# Patient Record
Sex: MALE | Race: BLACK OR AFRICAN AMERICAN | URBAN - METROPOLITAN AREA
[De-identification: names, ages, dates, MRNs, and addresses within clinical notes are randomized per-mention and may not be internally consistent; named-entity substitution may affect disease eponyms.]

---

## 2021-05-27 ENCOUNTER — WEB ENCOUNTER (OUTPATIENT)
Dept: URBAN - METROPOLITAN AREA CLINIC 128 | Facility: CLINIC | Age: 61
End: 2021-05-27

## 2021-05-27 ENCOUNTER — TELEPHONE ENCOUNTER (OUTPATIENT)
Dept: URBAN - METROPOLITAN AREA CLINIC 5 | Facility: CLINIC | Age: 61
End: 2021-05-27

## 2021-05-27 ENCOUNTER — OFFICE VISIT (OUTPATIENT)
Dept: URBAN - METROPOLITAN AREA CLINIC 128 | Facility: CLINIC | Age: 61
End: 2021-05-27
Payer: OTHER GOVERNMENT

## 2021-05-27 DIAGNOSIS — Z12.11 SCREEN FOR COLON CANCER: ICD-10-CM

## 2021-05-27 DIAGNOSIS — K75.4 AUTOIMMUNE HEPATITIS: ICD-10-CM

## 2021-05-27 PROCEDURE — 99203 OFFICE O/P NEW LOW 30 MIN: CPT | Performed by: INTERNAL MEDICINE

## 2021-05-27 RX ORDER — URSODIOL 500 MG/1
1 TABLET TABLET ORAL ONCE A DAY
Status: ACTIVE | COMMUNITY

## 2021-05-27 RX ORDER — TAMSULOSIN HYDROCHLORIDE 0.4 MG/1
1 CAPSULE CAPSULE ORAL ONCE A DAY
Status: ACTIVE | COMMUNITY

## 2021-05-27 RX ORDER — AZATHIOPRINE 75 MG/1
AS DIRECTED TABLET ORAL
Status: ACTIVE | COMMUNITY

## 2021-05-27 RX ORDER — SODIUM, POTASSIUM,MAG SULFATES 17.5-3.13G
SOLUTION, RECONSTITUTED, ORAL ORAL ONCE
Qty: 1 KIT | Refills: 0 | OUTPATIENT
Start: 2021-05-27

## 2021-05-27 NOTE — HPI-TODAY'S VISIT:
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 was receiving it from dr. butler who retired recently. now seen by Dr. LOVE at [facility]. due for colonscopy as last one was 10 years ago. no complaints.

## 2021-06-22 ENCOUNTER — OFFICE VISIT (OUTPATIENT)
Dept: URBAN - METROPOLITAN AREA SURGERY CENTER 31 | Facility: SURGERY CENTER | Age: 61
End: 2021-06-22
Payer: OTHER GOVERNMENT

## 2021-06-22 ENCOUNTER — CLAIMS CREATED FROM THE CLAIM WINDOW (OUTPATIENT)
Dept: URBAN - METROPOLITAN AREA CLINIC 4 | Facility: CLINIC | Age: 61
End: 2021-06-22
Payer: OTHER GOVERNMENT

## 2021-06-22 DIAGNOSIS — K63.89 POLYP OF ILEUM: ICD-10-CM

## 2021-06-22 DIAGNOSIS — Z12.11 COLON CANCER SCREENING: ICD-10-CM

## 2021-06-22 DIAGNOSIS — K63.5 BENIGN COLON POLYP: ICD-10-CM

## 2021-06-22 PROCEDURE — G8907 PT DOC NO EVENTS ON DISCHARG: HCPCS | Performed by: INTERNAL MEDICINE

## 2021-06-22 PROCEDURE — 45384 COLONOSCOPY W/LESION REMOVAL: CPT | Performed by: INTERNAL MEDICINE

## 2021-06-22 PROCEDURE — 88305 TISSUE EXAM BY PATHOLOGIST: CPT | Performed by: PATHOLOGY

## 2022-02-03 ENCOUNTER — TELEPHONE ENCOUNTER (OUTPATIENT)
Dept: URBAN - METROPOLITAN AREA CLINIC 19 | Facility: CLINIC | Age: 62
End: 2022-02-03

## 2022-02-03 ENCOUNTER — OFFICE VISIT (OUTPATIENT)
Dept: URBAN - METROPOLITAN AREA TELEHEALTH 2 | Facility: TELEHEALTH | Age: 62
End: 2022-02-03
Payer: OTHER GOVERNMENT

## 2022-02-03 DIAGNOSIS — K75.4 AUTOIMMUNE HEPATITIS: ICD-10-CM

## 2022-02-03 DIAGNOSIS — Z86.010 HISTORY OF COLON POLYPS: ICD-10-CM

## 2022-02-03 PROCEDURE — 99213 OFFICE O/P EST LOW 20 MIN: CPT | Performed by: INTERNAL MEDICINE

## 2022-02-03 RX ORDER — TAMSULOSIN HYDROCHLORIDE 0.4 MG/1
1 CAPSULE CAPSULE ORAL ONCE A DAY
Status: DISCONTINUED | COMMUNITY

## 2022-02-03 RX ORDER — AZATHIOPRINE 75 MG/1
AS DIRECTED TABLET ORAL
Status: ACTIVE | COMMUNITY

## 2022-02-03 RX ORDER — SODIUM, POTASSIUM,MAG SULFATES 17.5-3.13G
SOLUTION, RECONSTITUTED, ORAL ORAL ONCE
Qty: 1 KIT | Refills: 0 | Status: ON HOLD | COMMUNITY
Start: 2021-05-27

## 2022-02-03 RX ORDER — URSODIOL 500 MG/1
1 TABLET TABLET ORAL ONCE A DAY
Status: ACTIVE | COMMUNITY

## 2022-02-03 RX ORDER — TADALAFIL 20 MG/1
1 TABLET TABLET, FILM COATED ORAL
Status: ACTIVE | COMMUNITY

## 2022-02-03 NOTE — HPI-TODAY'S VISIT:
5/27/21 (Dr. Fabio Lubin):  history of autoimmune heaptitis x 10 years, controlled on azathioprine 75 mg daily. was receiving it from dr. trevizo who retired recently. now seen by Dr. LOVE at [facility]. due for colonscopy as last one was 10 years ago. no complaints.  2/3/22:  Patient presents via Telehealth from home to discuss his GI issues.  On 6/22/21, Dr. Lubin performed a colonoscopy and removed one polyp.  Patient due for surveillance colonoscopy in 5 years.  Dr. Lubin had recommended a 6-month follow-up clinic visit to readdress his autoimmune hepatitis, but apparently he was referred to me instead.  No records available from his prior gastroenterologists in [place] and [place] - Dr. Raad Trevizo reportedly just refilled his azathioprine and ursodiol and checked his LFTs; the patient wants a liver expert to evaluate his autoimmune hepatitis and make sure that things are being done appropriately.  He remembers that he was diagnosed with AIH when he was hospitalized in 2004-5 in [place] for severe acute hepatitis.  He has been on azathioprine ever since without any change in dosage.  Ursodiol was most likely added later when he was in [place].

## 2022-02-04 ENCOUNTER — TELEPHONE ENCOUNTER (OUTPATIENT)
Dept: URBAN - METROPOLITAN AREA CLINIC 19 | Facility: CLINIC | Age: 62
End: 2022-02-04

## 2022-02-08 ENCOUNTER — TELEPHONE ENCOUNTER (OUTPATIENT)
Dept: URBAN - METROPOLITAN AREA CLINIC 12 | Facility: CLINIC | Age: 62
End: 2022-02-08

## 2022-02-16 ENCOUNTER — OFFICE VISIT (OUTPATIENT)
Dept: URBAN - METROPOLITAN AREA CLINIC 18 | Facility: CLINIC | Age: 62
End: 2022-02-16
Payer: OTHER GOVERNMENT

## 2022-02-16 ENCOUNTER — TELEPHONE ENCOUNTER (OUTPATIENT)
Dept: URBAN - METROPOLITAN AREA CLINIC 19 | Facility: CLINIC | Age: 62
End: 2022-02-16

## 2022-02-16 DIAGNOSIS — K75.4 AUTOIMMUNE HEPATITIS: ICD-10-CM

## 2022-02-16 DIAGNOSIS — K82.4 GALLBLADDER POLYP: ICD-10-CM

## 2022-02-16 PROBLEM — 95570007 KIDNEY STONE: Status: ACTIVE | Noted: 2022-02-16

## 2022-02-16 PROCEDURE — 76705 ECHO EXAM OF ABDOMEN: CPT | Performed by: INTERNAL MEDICINE

## 2022-02-17 LAB
A/G RATIO: 1.8
ACTIN (SMOOTH MUSCLE) ANTIBODY: 16
ALBUMIN: 5
ALKALINE PHOSPHATASE: 95
ALT (SGPT): 15
ANA DIRECT: NEGATIVE
AST (SGOT): 24
BILIRUBIN, TOTAL: 0.4
BUN/CREATININE RATIO: 14
BUN: 14
CALCIUM: 10.1
CARBON DIOXIDE, TOTAL: 21
CHLORIDE: 99
CREATININE: 1.03
EGFR IF AFRICN AM: 91
EGFR IF NONAFRICN AM: 79
GLOBULIN, TOTAL: 2.8
GLUCOSE: 93
HEMATOCRIT: 40.1
HEMOGLOBIN: 13.1
IGG, SUBCLASS 4: 35
LIVER-KIDNEY MICROSOMAL AB: 0.7
MCH: 29.1
MCHC: 32.7
MCV: 89
MITOCHONDRIAL (M2) ANTIBODY: <20
NRBC: (no result)
PLATELETS: 297
POTASSIUM: 4.9
PROTEIN, TOTAL: 7.8
RBC: 4.5
RDW: 12.8
SODIUM: 139
WBC: 4.3

## 2022-03-03 ENCOUNTER — OFFICE VISIT (OUTPATIENT)
Dept: URBAN - METROPOLITAN AREA TELEHEALTH 2 | Facility: TELEHEALTH | Age: 62
End: 2022-03-03
Payer: OTHER GOVERNMENT

## 2022-03-03 ENCOUNTER — TELEPHONE ENCOUNTER (OUTPATIENT)
Dept: URBAN - METROPOLITAN AREA CLINIC 19 | Facility: CLINIC | Age: 62
End: 2022-03-03

## 2022-03-03 DIAGNOSIS — Z86.010 HISTORY OF COLON POLYPS: ICD-10-CM

## 2022-03-03 DIAGNOSIS — K75.4 AUTOIMMUNE HEPATITIS: ICD-10-CM

## 2022-03-03 PROBLEM — 408335007 AUTOIMMUNE HEPATITIS: Status: ACTIVE | Noted: 2021-05-27

## 2022-03-03 PROBLEM — 428283002 HISTORY OF POLYP OF COLON: Status: ACTIVE | Noted: 2022-02-03

## 2022-03-03 PROCEDURE — 99213 OFFICE O/P EST LOW 20 MIN: CPT | Performed by: INTERNAL MEDICINE

## 2022-03-03 RX ORDER — SODIUM, POTASSIUM,MAG SULFATES 17.5-3.13G
SOLUTION, RECONSTITUTED, ORAL ORAL ONCE
Qty: 1 KIT | Refills: 0 | COMMUNITY
Start: 2021-05-27

## 2022-03-03 RX ORDER — TADALAFIL 20 MG/1
1 TABLET TABLET, FILM COATED ORAL
COMMUNITY

## 2022-03-03 RX ORDER — AZATHIOPRINE 75 MG/1
AS DIRECTED TABLET ORAL
COMMUNITY

## 2022-03-03 RX ORDER — URSODIOL 500 MG/1
1 TABLET TABLET ORAL ONCE A DAY
COMMUNITY

## 2022-03-03 NOTE — HPI-TODAY'S VISIT:
5/27/21 (Dr. Fabio Lubin):  history of autoimmune heaptitis x 10 years, controlled on azathioprine 75 mg daily. was receiving it from dr. trevizo who retired recently. now seen by Dr. LOVE at [facility]. due for colonscopy as last one was 10 years ago. no complaints.  2/3/22:  Patient presents via Telehealth from home to discuss his GI issues.  On 6/22/21, Dr. Lubin performed a colonoscopy and removed one polyp.  Patient due for surveillance colonoscopy in 5 years.  Dr. Lubin had recommended a 6-month follow-up clinic visit to readdress his autoimmune hepatitis, but apparently he was referred to me instead.  No records available from his prior gastroenterologists in [place] and [place] - Dr. Raad Trevizo reportedly just refilled his azathioprine and ursodiol and checked his LFTs; the patient wants a liver expert to evaluate his autoimmune hepatitis and make sure that things are being done appropriately.  He remembers that he was diagnosed with AIH when he was hospitalized in 2004-5 in [place] for severe acute hepatitis.  He has been on azathioprine ever since without any change in dosage.  Ursodiol was most likely added later when he was in [place].  3/3/22:  Patient presents via Telehealth at home for follow-up.  His labs show normal liver function, and all of his autoimmune markers are normal, including GODWIN, ASMA, IgG, IgG4, LKM Ab, and AMA.  He said he had a liver biopsy done about 10 years ago, but he does not know the results.  I told him that if he wanted to know exactly what is going on with his liver and potentially come off his azathioprine and ursodiol, he should get another biopsy.  He agreed to proceed.  His daughter apparently is in medical school and has been very concerned - she gets very critical of him when he drinks alcohol.

## 2022-03-21 ENCOUNTER — TELEPHONE ENCOUNTER (OUTPATIENT)
Dept: URBAN - METROPOLITAN AREA TELEHEALTH 2 | Facility: TELEHEALTH | Age: 62
End: 2022-03-21

## 2023-08-14 ENCOUNTER — OFFICE VISIT (OUTPATIENT)
Dept: URBAN - METROPOLITAN AREA CLINIC 19 | Facility: CLINIC | Age: 63
End: 2023-08-14
Payer: OTHER GOVERNMENT

## 2023-08-14 ENCOUNTER — LAB OUTSIDE AN ENCOUNTER (OUTPATIENT)
Dept: URBAN - METROPOLITAN AREA CLINIC 19 | Facility: CLINIC | Age: 63
End: 2023-08-14

## 2023-08-14 VITALS
TEMPERATURE: 98.3 F | SYSTOLIC BLOOD PRESSURE: 139 MMHG | BODY MASS INDEX: 33.35 KG/M2 | WEIGHT: 238.2 LBS | DIASTOLIC BLOOD PRESSURE: 78 MMHG | HEIGHT: 71 IN

## 2023-08-14 DIAGNOSIS — K75.4 AUTOIMMUNE HEPATITIS: ICD-10-CM

## 2023-08-14 DIAGNOSIS — Z86.010 HISTORY OF COLON POLYPS: ICD-10-CM

## 2023-08-14 PROCEDURE — 99213 OFFICE O/P EST LOW 20 MIN: CPT | Performed by: INTERNAL MEDICINE

## 2023-08-14 RX ORDER — SODIUM, POTASSIUM,MAG SULFATES 17.5-3.13G
SOLUTION, RECONSTITUTED, ORAL ORAL ONCE
Qty: 1 KIT | Refills: 0 | Status: DISCONTINUED | COMMUNITY
Start: 2021-05-27

## 2023-08-14 RX ORDER — TADALAFIL 20 MG/1
1 TABLET TABLET, FILM COATED ORAL
Status: DISCONTINUED | COMMUNITY

## 2023-08-14 RX ORDER — AZATHIOPRINE 75 MG/1
AS DIRECTED TABLET ORAL
Status: DISCONTINUED | COMMUNITY

## 2023-08-14 RX ORDER — URSODIOL 500 MG/1
1 TABLET TABLET ORAL ONCE A DAY
Status: DISCONTINUED | COMMUNITY

## 2023-08-14 NOTE — HPI-TODAY'S VISIT:
5/27/21 (Dr. Fabio Lubin):  history of autoimmune heaptitis x 10 years, controlled on azathioprine 75 mg daily. was receiving it from dr. trevizo who retired recently. now seen by Dr. LOVE at [facility]. due for colonscopy as last one was 10 years ago. no complaints.  2/3/22:  Patient presents via Telehealth from home to discuss his GI issues.  On 6/22/21, Dr. Lubin performed a colonoscopy and removed one polyp.  Patient due for surveillance colonoscopy in 5 years.  Dr. Lubin had recommended a 6-month follow-up clinic visit to readdress his autoimmune hepatitis, but apparently he was referred to me instead.  No records available from his prior gastroenterologists in [place] and [place] - Dr. Raad Trevizo reportedly just refilled his azathioprine and ursodiol and checked his LFTs; the patient wants a liver expert to evaluate his autoimmune hepatitis and make sure that things are being done appropriately.  He remembers that he was diagnosed with AIH when he was hospitalized in 2004-5 in [place] for severe acute hepatitis.  He has been on azathioprine ever since without any change in dosage.  Ursodiol was most likely added later when he was in [place].  3/3/22:  Patient presents via Telehealth at home for follow-up.  His labs show normal liver function, and all of his autoimmune markers are normal, including GODWIN, ASMA, IgG, IgG4, LKM Ab, and AMA.  He said he had a liver biopsy done about 10 years ago, but he does not know the results.  I told him that if he wanted to know exactly what is going on with his liver and potentially come off his azathioprine and ursodiol, he should get another biopsy.  He agreed to proceed.  His daughter apparently is in medical school and has been very concerned - she gets very critical of him when he drinks alcohol.  8/14/23: Patient presents for follow-up of his liver disease. He never underwent a liver biopsy - he is concerned that he will be out of work for a long period of time if there is a complication. He shows no signs of liver disease today - no jaundice, confusion, bleeding, or ascites/edema. I never received his prior records - it is still not clear that he has autoimmune hepatitis - he has stopped taking azathioprine and ursodiol. We agreed that I will recheck his liver tests, and if they ever become abnormal again, I will get another liver biopsy. He was encouraged to abstain from alcohol as a precaution.

## 2023-08-18 LAB
A/G RATIO: 1.2
ACTIN (SMOOTH MUSCLE) ANTIBODY (IGG): 29
ALBUMIN: 4.3
ALKALINE PHOSPHATASE: 83
ALT (SGPT): 26
ANA SCREEN, IFA: POSITIVE
AST (SGOT): 28
BILIRUBIN, TOTAL: 0.4
BUN/CREATININE RATIO: (no result)
BUN: 17
CALCIUM: 10.2
CARBON DIOXIDE, TOTAL: 25
CHLORIDE: 101
CREATININE: 1.13
EGFR: 74
GLOBULIN, TOTAL: 3.5
GLUCOSE: 100
HEMATOCRIT: 44.9
HEMOGLOBIN: 14.4
IMMUNOGLOBULIN G, QN, SERUM: 1507
LKM-1 ANTIBODY (IGG): <=20
MCH: 28.6
MCHC: 32.1
MCV: 89.3
MPV: 10
PLATELET COUNT: 254
POTASSIUM: 4.2
PROTEIN, TOTAL: 7.8
RDW: 12.3
RED BLOOD CELL COUNT: 5.03
SODIUM: 136
WHITE BLOOD CELL COUNT: 5.5

## 2023-08-30 ENCOUNTER — LAB OUTSIDE AN ENCOUNTER (OUTPATIENT)
Dept: URBAN - METROPOLITAN AREA CLINIC 19 | Facility: CLINIC | Age: 63
End: 2023-08-30

## 2023-08-30 ENCOUNTER — TELEPHONE ENCOUNTER (OUTPATIENT)
Dept: URBAN - METROPOLITAN AREA CLINIC 19 | Facility: CLINIC | Age: 63
End: 2023-08-30

## 2023-08-30 ENCOUNTER — OFFICE VISIT (OUTPATIENT)
Dept: URBAN - METROPOLITAN AREA CLINIC 18 | Facility: CLINIC | Age: 63
End: 2023-08-30
Payer: OTHER GOVERNMENT

## 2023-08-30 DIAGNOSIS — R93.2 ABNORMAL ULTRASOUND OF LIVER: ICD-10-CM

## 2023-08-30 DIAGNOSIS — K80.20 CHOLELITHIASIS: ICD-10-CM

## 2023-08-30 PROCEDURE — 76705 ECHO EXAM OF ABDOMEN: CPT | Performed by: INTERNAL MEDICINE

## 2024-02-12 ENCOUNTER — OV EP (OUTPATIENT)
Dept: URBAN - METROPOLITAN AREA CLINIC 19 | Facility: CLINIC | Age: 64
End: 2024-02-12

## 2024-02-27 ENCOUNTER — OV EP (OUTPATIENT)
Dept: URBAN - METROPOLITAN AREA CLINIC 19 | Facility: CLINIC | Age: 64
End: 2024-02-27
Payer: OTHER GOVERNMENT

## 2024-02-27 VITALS
BODY MASS INDEX: 32.34 KG/M2 | HEIGHT: 71 IN | SYSTOLIC BLOOD PRESSURE: 130 MMHG | TEMPERATURE: 97.9 F | WEIGHT: 231 LBS | HEART RATE: 78 BPM | DIASTOLIC BLOOD PRESSURE: 70 MMHG

## 2024-02-27 DIAGNOSIS — Z86.010 HISTORY OF COLON POLYPS: ICD-10-CM

## 2024-02-27 DIAGNOSIS — K75.4 AUTOIMMUNE HEPATITIS: ICD-10-CM

## 2024-02-27 PROCEDURE — 99213 OFFICE O/P EST LOW 20 MIN: CPT | Performed by: INTERNAL MEDICINE

## 2024-02-27 NOTE — HPI-TODAY'S VISIT:
5/27/21 (Dr. Fabio Lubin):  history of autoimmune heaptitis x 10 years, controlled on azathioprine 75 mg daily. was receiving it from dr. trevizo who retired recently. now seen by Dr. LOVE at [facility]. due for colonscopy as last one was 10 years ago. no complaints.  2/3/22:  Patient presents via Telehealth from home to discuss his GI issues.  On 6/22/21, Dr. Lubin performed a colonoscopy and removed one polyp.  Patient due for surveillance colonoscopy in 5 years.  Dr. Lubin had recommended a 6-month follow-up clinic visit to readdress his autoimmune hepatitis, but apparently he was referred to me instead.  No records available from his prior gastroenterologists in [place] and [place] - Dr. Raad Trevizo reportedly just refilled his azathioprine and ursodiol and checked his LFTs; the patient wants a liver expert to evaluate his autoimmune hepatitis and make sure that things are being done appropriately.  He remembers that he was diagnosed with AIH when he was hospitalized in 2004-5 in [place] for severe acute hepatitis.  He has been on azathioprine ever since without any change in dosage.  Ursodiol was most likely added later when he was in [place].  3/3/22:  Patient presents via Telehealth at home for follow-up.  His labs show normal liver function, and all of his autoimmune markers are normal, including GODWIN, ASMA, IgG, IgG4, LKM Ab, and AMA.  He said he had a liver biopsy done about 10 years ago, but he does not know the results.  I told him that if he wanted to know exactly what is going on with his liver and potentially come off his azathioprine and ursodiol, he should get another biopsy.  He agreed to proceed.  His daughter apparently is in medical school and has been very concerned - she gets very critical of him when he drinks alcohol.  8/14/23: Patient presents for follow-up of his liver disease. He never underwent a liver biopsy - he is concerned that he will be out of work for a long period of time if there is a complication. He shows no signs of liver disease today - no jaundice, confusion, bleeding, or ascites/edema. I never received his prior records - it is still not clear that he has autoimmune hepatitis - he has stopped taking azathioprine and ursodiol. We agreed that I will recheck his liver tests, and if they ever become abnormal again, I will get another liver biopsy. He was encouraged to abstain from alcohol as a precaution.  2/27/24: Patient presents for reevaluation of autoimmune hepatitis. When we merly labs back in August 2023, his GODWIN was positive, and his ASMA was 29 (normal < 20). His LFTs were normal. He has declined a liver biopsy. He feels fine today.

## 2024-02-28 LAB
A/G RATIO: 1.2
ALBUMIN: 4.4
ALKALINE PHOSPHATASE: 86
ALT (SGPT): 120
AST (SGOT): 107
BILIRUBIN, TOTAL: 0.5
BUN/CREATININE RATIO: (no result)
BUN: 16
CALCIUM: 10.3
CARBON DIOXIDE, TOTAL: 25
CHLORIDE: 98
CREATININE: 1.02
EGFR: 83
GLOBULIN, TOTAL: 3.6
GLUCOSE: 102
HEMATOCRIT: 43.8
HEMOGLOBIN: 14.6
IMMUNOGLOBULIN G, QN, SERUM: 1490
MCH: 29.1
MCHC: 33.3
MCV: 87.4
MPV: 10
PLATELET COUNT: 286
POTASSIUM: 4.1
PROTEIN, TOTAL: 8
RDW: 13
RED BLOOD CELL COUNT: 5.01
SODIUM: 135
WHITE BLOOD CELL COUNT: 5.9

## 2024-03-09 ENCOUNTER — LAB (OUTPATIENT)
Dept: URBAN - METROPOLITAN AREA CLINIC 19 | Facility: CLINIC | Age: 64
End: 2024-03-09

## 2024-03-11 ENCOUNTER — LAB (OUTPATIENT)
Dept: URBAN - METROPOLITAN AREA CLINIC 98 | Facility: CLINIC | Age: 64
End: 2024-03-11

## 2024-03-11 ENCOUNTER — OV EP (OUTPATIENT)
Dept: URBAN - METROPOLITAN AREA CLINIC 98 | Facility: CLINIC | Age: 64
End: 2024-03-11
Payer: OTHER GOVERNMENT

## 2024-03-11 VITALS
DIASTOLIC BLOOD PRESSURE: 78 MMHG | WEIGHT: 228.8 LBS | BODY MASS INDEX: 32.03 KG/M2 | TEMPERATURE: 98.8 F | HEART RATE: 83 BPM | SYSTOLIC BLOOD PRESSURE: 149 MMHG | HEIGHT: 71 IN

## 2024-03-11 DIAGNOSIS — R93.2 ABNORMAL LIVER DIAGNOSTIC IMAGING: ICD-10-CM

## 2024-03-11 DIAGNOSIS — R74.8 ELEVATED LIVER ENZYMES: ICD-10-CM

## 2024-03-11 DIAGNOSIS — K75.4 AUTOIMMUNE HEPATITIS: ICD-10-CM

## 2024-03-11 PROCEDURE — 99214 OFFICE O/P EST MOD 30 MIN: CPT | Performed by: INTERNAL MEDICINE

## 2024-03-11 NOTE — HPI-TODAY'S VISIT:
Pt of Dr Martel ; on request to review elevated liver tests , rising on recent check Diagnosed with AIH in [place] in early 2000s  Was on AZA then UDCA as well  stopped these medications a few years ago  Has been recommended to have a liver biopsy for many years - but pt has been resistant US 8/2023 w coarsened echotexture, gallstones . Centrum silver  fiber pills (Vivi) vit c (Vivi) potassium ([employer])

## 2024-03-12 LAB
A/G RATIO: 1.1
ALBUMIN: 4
ALKALINE PHOSPHATASE: 97
ALT (SGPT): 252
AST (SGOT): 189
BASO (ABSOLUTE): 0
BASOS: 1
BILIRUBIN, TOTAL: <0.2
BUN/CREATININE RATIO: 11
BUN: 12
CALCIUM: 10.1
CARBON DIOXIDE, TOTAL: 24
CHLORIDE: 101
CREATININE: 1.06
EGFR: 79
EOS (ABSOLUTE): 0.2
EOS: 5
GGT: 73
GLOBULIN, TOTAL: 3.5
GLUCOSE: 116
HBSAG SCREEN: NEGATIVE
HCV AB: NON REACTIVE
HEMATOCRIT: 40.5
HEMATOLOGY COMMENTS:: (no result)
HEMOGLOBIN: 13.2
HEP A AB, TOTAL: POSITIVE
HEP B CORE AB, TOT: NEGATIVE
HEPATITIS B SURF AB QUANT: 105.2
IMMATURE CELLS: (no result)
IMMATURE GRANS (ABS): 0
IMMATURE GRANULOCYTES: 0
IMMUNOGLOBULIN A, QN, SERUM: 281
IMMUNOGLOBULIN G, QN, SERUM: 1424
IMMUNOGLOBULIN M, QN, SERUM: 135
INTERPRETATION:: (no result)
LYMPHS (ABSOLUTE): 2.4
LYMPHS: 49
MCH: 29.2
MCHC: 32.6
MCV: 90
MONOCYTES(ABSOLUTE): 0.6
MONOCYTES: 12
NEUTROPHILS (ABSOLUTE): 1.6
NEUTROPHILS: 33
NRBC: (no result)
PLATELETS: 263
POTASSIUM: 4.3
PROTEIN, TOTAL: 7.5
RBC: 4.52
RDW: 12.7
SODIUM: 141
WBC: 4.9

## 2024-03-13 ENCOUNTER — LAB (OUTPATIENT)
Dept: URBAN - METROPOLITAN AREA CLINIC 98 | Facility: CLINIC | Age: 64
End: 2024-03-13

## 2024-03-26 ENCOUNTER — LAB (OUTPATIENT)
Dept: URBAN - METROPOLITAN AREA CLINIC 98 | Facility: CLINIC | Age: 64
End: 2024-03-26

## 2024-04-22 ENCOUNTER — TELEP (OUTPATIENT)
Dept: URBAN - METROPOLITAN AREA TELEHEALTH 2 | Facility: TELEHEALTH | Age: 64
End: 2024-04-22
Payer: OTHER GOVERNMENT

## 2024-04-22 VITALS — WEIGHT: 221 LBS | HEIGHT: 71 IN | BODY MASS INDEX: 30.94 KG/M2

## 2024-04-22 DIAGNOSIS — K75.4 AUTOIMMUNE HEPATITIS: ICD-10-CM

## 2024-04-22 PROCEDURE — 99214 OFFICE O/P EST MOD 30 MIN: CPT | Performed by: INTERNAL MEDICINE

## 2024-04-22 RX ORDER — BUDESONIDE 3 MG/1
3 CAP ORASDIRECTED CAPSULE ORAL ONCE A DAY
Qty: 90 CAPSULE | Refills: 3 | OUTPATIENT
Start: 2024-04-22

## 2024-04-22 NOTE — HPI-TODAY'S VISIT:
Tolerated liver biopsy well ; has no complaints  previously did well on prednisone - some wt gain and insomnia noted however  previously did tolerate AZA wel  no other new medications / supplements

## 2024-05-13 ENCOUNTER — WEB ENCOUNTER (OUTPATIENT)
Dept: URBAN - METROPOLITAN AREA CLINIC 98 | Facility: CLINIC | Age: 64
End: 2024-05-13

## 2024-05-20 ENCOUNTER — LAB OUTSIDE AN ENCOUNTER (OUTPATIENT)
Dept: URBAN - METROPOLITAN AREA TELEHEALTH 2 | Facility: TELEHEALTH | Age: 64
End: 2024-05-20

## 2024-05-23 LAB
A/G RATIO: 1.1
ALBUMIN: 4.2
ALKALINE PHOSPHATASE: 111
ALT (SGPT): 182
AST (SGOT): 157
BASO (ABSOLUTE): 0
BASOS: 1
BILIRUBIN, TOTAL: 0.7
BUN/CREATININE RATIO: 14
BUN: 16
CALCIUM: 9.9
CARBON DIOXIDE, TOTAL: 25
CHLORIDE: 101
CREATININE: 1.14
EGFR: 73
EOS (ABSOLUTE): 0.1
EOS: 2
GLOBULIN, TOTAL: 3.9
GLUCOSE: 101
HEMATOCRIT: 43
HEMATOLOGY COMMENTS:: (no result)
HEMOGLOBIN: 14.3
IMMATURE CELLS: (no result)
IMMATURE GRANS (ABS): 0
IMMATURE GRANULOCYTES: 1
IMMUNOGLOBULIN A, QN, SERUM: 356
IMMUNOGLOBULIN G, QN, SERUM: 2129
IMMUNOGLOBULIN M, QN, SERUM: 171
LYMPHS (ABSOLUTE): 2.8
LYMPHS: 44
MCH: 29.1
MCHC: 33.3
MCV: 88
MONOCYTES(ABSOLUTE): 0.8
MONOCYTES: 13
NEUTROPHILS (ABSOLUTE): 2.5
NEUTROPHILS: 39
NRBC: (no result)
PLATELETS: 218
POTASSIUM: 4.4
PROTEIN, TOTAL: 8.1
RBC: 4.91
RDW: 13.8
SODIUM: 139
WBC: 6.3

## 2024-05-30 ENCOUNTER — WEB ENCOUNTER (OUTPATIENT)
Dept: URBAN - METROPOLITAN AREA CLINIC 98 | Facility: CLINIC | Age: 64
End: 2024-05-30

## 2024-06-04 ENCOUNTER — WEB ENCOUNTER (OUTPATIENT)
Dept: URBAN - METROPOLITAN AREA CLINIC 98 | Facility: CLINIC | Age: 64
End: 2024-06-04

## 2024-06-06 ENCOUNTER — WEB ENCOUNTER (OUTPATIENT)
Dept: URBAN - METROPOLITAN AREA CLINIC 98 | Facility: CLINIC | Age: 64
End: 2024-06-06

## 2024-06-06 RX ORDER — AZATHIOPRINE 50 MG/1
1 TAB TABLET ORAL DAILY
Qty: 90 TABLET | Refills: 1 | OUTPATIENT
Start: 2024-06-10 | End: 2024-12-06

## 2024-06-10 ENCOUNTER — WEB ENCOUNTER (OUTPATIENT)
Dept: URBAN - METROPOLITAN AREA CLINIC 98 | Facility: CLINIC | Age: 64
End: 2024-06-10

## 2024-07-09 ENCOUNTER — WEB ENCOUNTER (OUTPATIENT)
Dept: URBAN - METROPOLITAN AREA CLINIC 98 | Facility: CLINIC | Age: 64
End: 2024-07-09

## 2024-07-09 RX ORDER — BUDESONIDE 3 MG/1
3 CAP ORASDIRECTED CAPSULE ORAL ONCE A DAY
Qty: 270 | Refills: 0
Start: 2024-04-22

## 2024-08-06 ENCOUNTER — WEB ENCOUNTER (OUTPATIENT)
Dept: URBAN - METROPOLITAN AREA CLINIC 86 | Facility: CLINIC | Age: 64
End: 2024-08-06

## 2024-08-08 LAB
ALBUMIN: 4.2
ALKALINE PHOSPHATASE: 92
ALT (SGPT): 49
AST (SGOT): 56
BASO (ABSOLUTE): 0
BASOS: 0
BILIRUBIN, TOTAL: 0.6
BUN/CREATININE RATIO: 13
BUN: 15
CALCIUM: 10.1
CARBON DIOXIDE, TOTAL: 22
CHLORIDE: 97
CREATININE: 1.12
EGFR: 74
EOS (ABSOLUTE): 0.1
EOS: 2
GLOBULIN, TOTAL: 3.3
GLUCOSE: 104
HEMATOCRIT: 45.9
HEMATOLOGY COMMENTS:: (no result)
HEMOGLOBIN: 14.7
IMMATURE CELLS: (no result)
IMMATURE GRANS (ABS): 0
IMMATURE GRANULOCYTES: 0
IMMUNOGLOBULIN A, QN, SERUM: 338
IMMUNOGLOBULIN G, QN, SERUM: 1695
IMMUNOGLOBULIN M, QN, SERUM: 170
LYMPHS (ABSOLUTE): 2.6
LYMPHS: 47
MCH: 29.9
MCHC: 32
MCV: 94
MONOCYTES(ABSOLUTE): 0.6
MONOCYTES: 10
NEUTROPHILS (ABSOLUTE): 2.3
NEUTROPHILS: 41
NRBC: (no result)
PLATELETS: 239
POTASSIUM: 3.9
PROTEIN, TOTAL: 7.5
RBC: 4.91
RDW: 13.1
SODIUM: 137
WBC: 5.6

## 2024-08-10 ENCOUNTER — LAB OUTSIDE AN ENCOUNTER (OUTPATIENT)
Dept: URBAN - METROPOLITAN AREA CLINIC 98 | Facility: CLINIC | Age: 64
End: 2024-08-10

## 2024-08-12 ENCOUNTER — OFFICE VISIT (OUTPATIENT)
Dept: URBAN - METROPOLITAN AREA TELEHEALTH 2 | Facility: TELEHEALTH | Age: 64
End: 2024-08-12
Payer: OTHER GOVERNMENT

## 2024-08-12 ENCOUNTER — TELEPHONE ENCOUNTER (OUTPATIENT)
Dept: URBAN - METROPOLITAN AREA CLINIC 98 | Facility: CLINIC | Age: 64
End: 2024-08-12

## 2024-08-12 ENCOUNTER — DASHBOARD ENCOUNTERS (OUTPATIENT)
Age: 64
End: 2024-08-12

## 2024-08-12 VITALS — WEIGHT: 235 LBS | BODY MASS INDEX: 32.9 KG/M2 | HEIGHT: 71 IN

## 2024-08-12 DIAGNOSIS — R93.2 ABNORMAL LIVER DIAGNOSTIC IMAGING: ICD-10-CM

## 2024-08-12 DIAGNOSIS — R74.8 ELEVATED LIVER ENZYMES: ICD-10-CM

## 2024-08-12 DIAGNOSIS — K75.4 AUTOIMMUNE HEPATITIS: ICD-10-CM

## 2024-08-12 PROCEDURE — 99214 OFFICE O/P EST MOD 30 MIN: CPT | Performed by: INTERNAL MEDICINE

## 2024-08-12 RX ORDER — BUDESONIDE 3 MG/1
3 CAP ORASDIRECTED CAPSULE ORAL ONCE A DAY
Qty: 270 | Refills: 0 | Status: ACTIVE | COMMUNITY
Start: 2024-04-22

## 2024-08-12 RX ORDER — AZATHIOPRINE 50 MG/1
2 TAB TABLET ORAL DAILY
Qty: 180 TABLET | Refills: 3
Start: 2024-06-10 | End: 2025-08-07

## 2024-08-12 RX ORDER — AZATHIOPRINE 50 MG/1
1 TAB TABLET ORAL DAILY
Qty: 90 TABLET | Refills: 1 | Status: ACTIVE | COMMUNITY
Start: 2024-06-10 | End: 2024-12-06

## 2024-10-11 LAB
ALBUMIN: 4.1
ALKALINE PHOSPHATASE: 87
ALT (SGPT): 36
AST (SGOT): 59
BASO (ABSOLUTE): 0
BASOS: 0
BILIRUBIN, TOTAL: 0.6
BUN/CREATININE RATIO: 17
BUN: 19
CALCIUM: 9.8
CARBON DIOXIDE, TOTAL: 20
CHLORIDE: 98
CREATININE: 1.13
EGFR: 73
EOS (ABSOLUTE): 0.1
EOS: 2
GLOBULIN, TOTAL: 3.6
GLUCOSE: 116
HEMATOCRIT: 45.5
HEMATOLOGY COMMENTS:: (no result)
HEMOGLOBIN: 14.8
IMMATURE CELLS: (no result)
IMMATURE GRANS (ABS): 0
IMMATURE GRANULOCYTES: 1
IMMUNOGLOBULIN A, QN, SERUM: 363
IMMUNOGLOBULIN G, QN, SERUM: 1499
IMMUNOGLOBULIN M, QN, SERUM: 172
LYMPHS (ABSOLUTE): 2.4
LYMPHS: 43
MCH: 30.7
MCHC: 32.5
MCV: 94
MONOCYTES(ABSOLUTE): 0.7
MONOCYTES: 13
NEUTROPHILS (ABSOLUTE): 2.2
NEUTROPHILS: 41
NRBC: (no result)
PLATELETS: 250
POTASSIUM: 4.3
PROTEIN, TOTAL: 7.7
RBC: 4.82
RDW: 13.4
SODIUM: 134
WBC: 5.4

## 2024-10-12 ENCOUNTER — LAB OUTSIDE AN ENCOUNTER (OUTPATIENT)
Dept: URBAN - METROPOLITAN AREA TELEHEALTH 2 | Facility: TELEHEALTH | Age: 64
End: 2024-10-12

## 2024-10-13 ENCOUNTER — TELEPHONE ENCOUNTER (OUTPATIENT)
Dept: URBAN - METROPOLITAN AREA CLINIC 98 | Facility: CLINIC | Age: 64
End: 2024-10-13

## 2024-10-14 ENCOUNTER — WEB ENCOUNTER (OUTPATIENT)
Dept: URBAN - METROPOLITAN AREA CLINIC 98 | Facility: CLINIC | Age: 64
End: 2024-10-14

## 2024-10-31 ENCOUNTER — WEB ENCOUNTER (OUTPATIENT)
Dept: URBAN - METROPOLITAN AREA CLINIC 98 | Facility: CLINIC | Age: 64
End: 2024-10-31

## 2024-10-31 RX ORDER — AZATHIOPRINE 50 MG/1
2 TAB TABLET ORAL DAILY
Qty: 180 | Refills: 3
Start: 2024-06-10 | End: 2025-10-26

## 2024-11-22 ENCOUNTER — ERX REFILL RESPONSE (OUTPATIENT)
Dept: URBAN - METROPOLITAN AREA CLINIC 98 | Facility: CLINIC | Age: 64
End: 2024-11-22

## 2024-11-22 RX ORDER — BUDESONIDE 3 MG/1
3 CAP ORASDIRECTED CAPSULE ORAL ONCE A DAY
Qty: 270 | Refills: 0 | OUTPATIENT

## 2024-12-13 ENCOUNTER — LAB OUTSIDE AN ENCOUNTER (OUTPATIENT)
Dept: URBAN - METROPOLITAN AREA CLINIC 98 | Facility: CLINIC | Age: 64
End: 2024-12-13

## 2025-02-25 ENCOUNTER — LAB OUTSIDE AN ENCOUNTER (OUTPATIENT)
Dept: URBAN - METROPOLITAN AREA CLINIC 98 | Facility: CLINIC | Age: 65
End: 2025-02-25

## 2025-02-25 ENCOUNTER — OFFICE VISIT (OUTPATIENT)
Dept: URBAN - METROPOLITAN AREA CLINIC 19 | Facility: CLINIC | Age: 65
End: 2025-02-25

## 2025-02-25 ENCOUNTER — WEB ENCOUNTER (OUTPATIENT)
Dept: URBAN - METROPOLITAN AREA CLINIC 98 | Facility: CLINIC | Age: 65
End: 2025-02-25

## 2025-02-25 VITALS
HEART RATE: 77 BPM | BODY MASS INDEX: 33.52 KG/M2 | TEMPERATURE: 98.6 F | OXYGEN SATURATION: 96 % | SYSTOLIC BLOOD PRESSURE: 130 MMHG | HEIGHT: 71 IN | DIASTOLIC BLOOD PRESSURE: 80 MMHG | WEIGHT: 239.4 LBS

## 2025-02-25 RX ORDER — AZATHIOPRINE 50 MG/1
2 TAB TABLET ORAL DAILY
Qty: 180 | Refills: 3 | Status: ACTIVE | COMMUNITY
Start: 2024-06-10 | End: 2025-10-26

## 2025-02-25 RX ORDER — BUDESONIDE 3 MG/1
1 CAPSULE CAPSULE ORAL ONCE A DAY
Qty: 90 CAPSULE | Refills: 0 | Status: ACTIVE | COMMUNITY

## 2025-02-26 ENCOUNTER — TELEPHONE ENCOUNTER (OUTPATIENT)
Dept: URBAN - METROPOLITAN AREA CLINIC 98 | Facility: CLINIC | Age: 65
End: 2025-02-26

## 2025-02-27 ENCOUNTER — OFFICE VISIT (OUTPATIENT)
Dept: URBAN - METROPOLITAN AREA CLINIC 19 | Facility: CLINIC | Age: 65
End: 2025-02-27

## 2025-03-04 ENCOUNTER — LAB OUTSIDE AN ENCOUNTER (OUTPATIENT)
Dept: URBAN - METROPOLITAN AREA CLINIC 98 | Facility: CLINIC | Age: 65
End: 2025-03-04

## 2025-03-05 ENCOUNTER — TELEPHONE ENCOUNTER (OUTPATIENT)
Dept: URBAN - METROPOLITAN AREA CLINIC 98 | Facility: CLINIC | Age: 65
End: 2025-03-05

## 2025-03-05 LAB
ALBUMIN: 4.5
ALKALINE PHOSPHATASE: 91
ALT (SGPT): 19
AST (SGOT): 31
BILIRUBIN, TOTAL: 0.6
BUN/CREATININE RATIO: 13
BUN: 13
CALCIUM: 10.2
CARBON DIOXIDE, TOTAL: 23
CHLORIDE: 97
CREATININE: 0.98
EGFR: 87
GLOBULIN, TOTAL: 3.2
GLUCOSE: 131
POTASSIUM: 4.1
PROTEIN, TOTAL: 7.7
SODIUM: 136

## 2025-03-27 ENCOUNTER — OFFICE VISIT (OUTPATIENT)
Dept: URBAN - METROPOLITAN AREA TELEHEALTH 2 | Facility: TELEHEALTH | Age: 65
End: 2025-03-27
Payer: OTHER GOVERNMENT

## 2025-03-27 DIAGNOSIS — R74.8 ELEVATED LIVER ENZYMES: ICD-10-CM

## 2025-03-27 DIAGNOSIS — R93.2 ABNORMAL LIVER DIAGNOSTIC IMAGING: ICD-10-CM

## 2025-03-27 DIAGNOSIS — K75.4 AUTOIMMUNE HEPATITIS: ICD-10-CM

## 2025-03-27 PROCEDURE — 99214 OFFICE O/P EST MOD 30 MIN: CPT | Performed by: INTERNAL MEDICINE

## 2025-03-27 RX ORDER — AZATHIOPRINE 50 MG/1
2 TAB TABLET ORAL DAILY
Qty: 180 | Refills: 3 | Status: ACTIVE | COMMUNITY
Start: 2024-06-10 | End: 2025-10-26

## 2025-03-27 RX ORDER — AZATHIOPRINE 50 MG/1
2 TAB TABLET ORAL DAILY
Qty: 180 TABLET | Refills: 3
Start: 2025-03-25 | End: 2026-03-20

## 2025-03-27 RX ORDER — BUDESONIDE 3 MG/1
1 CAPSULE CAPSULE ORAL ONCE A DAY
Qty: 90 CAPSULE | Refills: 0 | Status: ACTIVE | COMMUNITY

## 2025-05-27 ENCOUNTER — LAB OUTSIDE AN ENCOUNTER (OUTPATIENT)
Dept: URBAN - METROPOLITAN AREA TELEHEALTH 2 | Facility: TELEHEALTH | Age: 65
End: 2025-05-27

## 2025-06-12 ENCOUNTER — LAB OUTSIDE AN ENCOUNTER (OUTPATIENT)
Dept: URBAN - METROPOLITAN AREA CLINIC 98 | Facility: CLINIC | Age: 65
End: 2025-06-12

## 2025-06-13 ENCOUNTER — TELEPHONE ENCOUNTER (OUTPATIENT)
Dept: URBAN - METROPOLITAN AREA CLINIC 98 | Facility: CLINIC | Age: 65
End: 2025-06-13

## 2025-06-13 LAB
ALBUMIN: 4.5
ALKALINE PHOSPHATASE: 89
ALT (SGPT): 20
AST (SGOT): 35
BASO (ABSOLUTE): 0
BASOS: 1
BILIRUBIN, TOTAL: 0.4
BUN/CREATININE RATIO: 10
BUN: 11
CALCIUM: 10.2
CARBON DIOXIDE, TOTAL: 20
CHLORIDE: 99
CREATININE: 1.08
EGFR: 77
EOS (ABSOLUTE): 0.3
EOS: 5
GLOBULIN, TOTAL: 3.4
GLUCOSE: 115
HEMATOCRIT: 44.4
HEMATOLOGY COMMENTS:: (no result)
HEMOGLOBIN: 14.4
IMMATURE CELLS: (no result)
IMMATURE GRANS (ABS): 0
IMMATURE GRANULOCYTES: 0
IMMUNOGLOBULIN A, QN, SERUM: 323
IMMUNOGLOBULIN G, QN, SERUM: 1427
IMMUNOGLOBULIN M, QN, SERUM: 158
LYMPHS (ABSOLUTE): 3.4
LYMPHS: 59
MCH: 31.2
MCHC: 32.4
MCV: 96
MONOCYTES(ABSOLUTE): 0.6
MONOCYTES: 10
NEUTROPHILS (ABSOLUTE): 1.4
NEUTROPHILS: 25
NRBC: (no result)
PLATELETS: 231
POTASSIUM: 4.6
PROTEIN, TOTAL: 7.9
RBC: 4.62
RDW: 13
SODIUM: 136
WBC: 5.7

## 2025-08-09 LAB
ALBUMIN: 4.5
ALKALINE PHOSPHATASE: 84
ALT (SGPT): 18
AST (SGOT): 26
BASO (ABSOLUTE): 0
BASOS: 1
BILIRUBIN, TOTAL: 0.4
BUN/CREATININE RATIO: 10
BUN: 11
CALCIUM: 10
CARBON DIOXIDE, TOTAL: 20
CHLORIDE: 101
CREATININE: 1.06
EGFR: 79
EOS (ABSOLUTE): 0.2
EOS: 3
GLOBULIN, TOTAL: 2.9
GLUCOSE: 120
HEMATOCRIT: 43
HEMATOLOGY COMMENTS:: (no result)
HEMOGLOBIN: 13.9
IMMATURE CELLS: (no result)
IMMATURE GRANS (ABS): 0
IMMATURE GRANULOCYTES: 0
IMMUNOGLOBULIN A, QN, SERUM: 286
IMMUNOGLOBULIN E, TOTAL: 71
IMMUNOGLOBULIN G, QN, SERUM: 1334
IMMUNOGLOBULIN M, QN, SERUM: 138
LYMPHS (ABSOLUTE): 3.3
LYMPHS: 52
MCH: 29.9
MCHC: 32.3
MCV: 93
MONOCYTES(ABSOLUTE): 0.7
MONOCYTES: 12
NEUTROPHILS (ABSOLUTE): 2
NEUTROPHILS: 32
NRBC: (no result)
PLATELETS: 251
POTASSIUM: 4.3
PROTEIN, TOTAL: 7.4
RBC: 4.65
RDW: 12.6
SODIUM: 138
WBC: 6.2

## 2025-08-11 ENCOUNTER — OFFICE VISIT (OUTPATIENT)
Dept: URBAN - METROPOLITAN AREA TELEHEALTH 2 | Facility: TELEHEALTH | Age: 65
End: 2025-08-11
Payer: OTHER GOVERNMENT

## 2025-08-11 ENCOUNTER — TELEPHONE ENCOUNTER (OUTPATIENT)
Dept: URBAN - METROPOLITAN AREA CLINIC 98 | Facility: CLINIC | Age: 65
End: 2025-08-11

## 2025-08-11 DIAGNOSIS — K75.4 AUTOIMMUNE HEPATITIS: ICD-10-CM

## 2025-08-11 DIAGNOSIS — R74.8 ELEVATED LIVER ENZYMES: ICD-10-CM

## 2025-08-11 DIAGNOSIS — R93.2 ABNORMAL LIVER DIAGNOSTIC IMAGING: ICD-10-CM

## 2025-08-11 PROCEDURE — 99214 OFFICE O/P EST MOD 30 MIN: CPT | Performed by: INTERNAL MEDICINE

## 2025-08-11 RX ORDER — AZATHIOPRINE 50 MG/1
2 TAB TABLET ORAL DAILY
Qty: 180 TABLET | Refills: 3 | Status: ACTIVE | COMMUNITY
Start: 2025-03-25 | End: 2026-03-20

## 2025-08-11 RX ORDER — AZATHIOPRINE 50 MG/1
2 TAB TABLET ORAL DAILY
Qty: 180 TABLET | Refills: 3
Start: 2025-08-10 | End: 2026-08-05

## 2025-08-13 ENCOUNTER — LAB OUTSIDE AN ENCOUNTER (OUTPATIENT)
Dept: URBAN - METROPOLITAN AREA CLINIC 98 | Facility: CLINIC | Age: 65
End: 2025-08-13